# Patient Record
Sex: MALE | Race: BLACK OR AFRICAN AMERICAN | Employment: FULL TIME | URBAN - METROPOLITAN AREA
[De-identification: names, ages, dates, MRNs, and addresses within clinical notes are randomized per-mention and may not be internally consistent; named-entity substitution may affect disease eponyms.]

---

## 2022-03-26 ENCOUNTER — HOSPITAL ENCOUNTER (EMERGENCY)
Age: 37
Discharge: HOME | End: 2022-03-26
Attending: EMERGENCY MEDICINE

## 2022-03-26 VITALS
RESPIRATION RATE: 16 BRPM | HEART RATE: 86 BPM | SYSTOLIC BLOOD PRESSURE: 127 MMHG | WEIGHT: 155 LBS | TEMPERATURE: 99.6 F | DIASTOLIC BLOOD PRESSURE: 77 MMHG | HEIGHT: 69 IN | OXYGEN SATURATION: 96 % | BODY MASS INDEX: 22.96 KG/M2

## 2022-03-26 DIAGNOSIS — Z20.822 PERSON UNDER INVESTIGATION FOR COVID-19: ICD-10-CM

## 2022-03-26 DIAGNOSIS — J06.9 VIRAL UPPER RESPIRATORY TRACT INFECTION: Primary | ICD-10-CM

## 2022-03-26 LAB — SARS-COV-2, COV2: NORMAL

## 2022-03-26 PROCEDURE — U0003 INFECTIOUS AGENT DETECTION BY NUCLEIC ACID (DNA OR RNA); SEVERE ACUTE RESPIRATORY SYNDROME CORONAVIRUS 2 (SARS-COV-2) (CORONAVIRUS DISEASE [COVID-19]), AMPLIFIED PROBE TECHNIQUE, MAKING USE OF HIGH THROUGHPUT TECHNOLOGIES AS DESCRIBED BY CMS-2020-01-R: HCPCS

## 2022-03-26 PROCEDURE — 99283 EMERGENCY DEPT VISIT LOW MDM: CPT

## 2022-03-26 NOTE — LETTER
[facility] [place]  [facility]  [address]  [facility] NEWS [facility] [id number] [phone]    Work/School Note    Date: 3/26/2022     To Whom It May concern:    Ian Go was evaluated by the following provider(s):  Attending Provider: Jemal Camejo MD  Physician Assistant: Sean Tello, [address] [place] [address] virus is suspected. Test is PENDING and expected in 48-72hrs. Per the CDC guidelines we recommend home isolation until the following conditions are all met:    1. At least five days have passed since symptoms first appeared (3/30/22)  2. After home isolation for five days, wearing a mask around others for the next five days,  3. At least 24 have passed since last fever without the use of fever-reducing medications and  4.  Symptoms (eg cough, shortness of breath) have improved      Sincerely,          MAC Morton

## 2022-03-26 NOTE — ED PROVIDER NOTES
EMERGENCY DEPARTMENT HISTORY AND PHYSICAL EXAM    Date: 3/26/2022  Patient Name: Macy Bonilla    History of Presenting Illness     Chief Complaint   Patient presents with    Covid Testing         History Provided By: Patient    11:36 AM  Macy Bonilla is a 30 y.o. male  who presents to the emergency department C/O request for COVID-19 test.  Patient states he began feeling ill yesterday with nasal congestion, slight sore throat, cough, subjective fever body aches and alteration in his taste. He is not vaccinated against COVID-19. 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 Pt denies ear pain, chest pain, shortness of breath,, and any other sxs or complaints. PCP: None    Current Outpatient Medications   Medication Sig Dispense Refill    oxyCODONE-acetaminophen (PERCOCET) 5-325 mg per tablet Take 1 Tab by mouth every four (4) hours as needed for Pain. 20 Tab 0       Past History     Past Medical History:  No past medical history on file. Past Surgical History:  No past surgical history on file. Family History:  No family history on file. Social History:  Social History     Tobacco Use    Smoking status: Not on file    Smokeless tobacco: Not on file   Substance Use Topics    Alcohol use: Not on file    Drug use: Not on file       Allergies:  No Known Allergies      Review of Systems   Review of Systems   Constitutional: Positive for fever. HENT: Positive for congestion and sore throat. Respiratory: Positive for cough. Musculoskeletal: Positive for myalgias. All other systems reviewed and are negative. Physical Exam     Vitals:    03/26/22 1119   BP: 127/77   Pulse: 86   Resp: 16   Temp: 99.6 °F (37.6 °C)   SpO2: 96%   Weight: 70.3 kg (155 lb)   Height: 5' 9\" (1.753 m)     Physical Exam  Vital signs and nursing notes reviewed. CONSTITUTIONAL: Alert. Well-appearing; well-nourished; in no apparent distress. HEAD: Normocephalic; atraumatic. EYES: Conjunctiva clear.    ENT: TM's normal. External ear normal. Normal nose; no rhinorrhea. Normal pharynx. Tonsils not enlarged without exudate. Moist mucus membranes. NECK: Supple; FROM without difficulty, non-tender; no cervical lymphadenopathy. CV: Normal S1, S2; no murmurs, rubs, or gallops. No chest wall tenderness. RESPIRATORY: Normal chest excursion with respiration; breath sounds clear and equal bilaterally; no wheezes, rhonchi, or rales. NEURO: A & O x3. PSYCH:  Mood and affect appropriate. Diagnostic Study Results     Labs -     Recent Results (from the past 12 hour(s))   SARS-COV-2    Collection Time: 03/26/22 11:50 AM   Result Value Ref Range    SARS-CoV-2 by PCR Please find results under separate order         Radiologic Studies -   No orders to display     CT Results  (Last 48 hours)    None        CXR Results  (Last 48 hours)    None          Medications given in the ED-  Medications - No data to display      Medical Decision Making   I am the first provider for this patient. I reviewed the vital signs, available nursing notes, past medical history, past surgical history, family history and social history. Vital Signs-Reviewed the patient's vital signs. Records Reviewed: Nursing Notes      Procedures:  Procedures    ED Course:  11:36 AM   Initial assessment performed. The patients presenting problems have been discussed, and they are in agreement with the care plan formulated and outlined with them. I have encouraged them to ask questions as they arise throughout their visit. Provider Notes (Medical Decision Making): Joe Yanes is a 30 y.o. male presents with mild URI symptoms that began yesterday and concern for COVID-19. He is not vaccinated. His vitals are stable, exam normal.  Symptoms are consistent with a viral URI. COVID-19 test sent and pending at this time. Strict isolation per current CDC guidelines and return precautions discussed.   COVID-19 test sent and pending at this time. Diagnosis and Disposition       DISCHARGE NOTE:    Mariella Fuller  results have been reviewed with him. He has been counseled regarding his diagnosis, treatment, and plan. He verbally conveys understanding and agreement of the signs, symptoms, diagnosis, treatment and prognosis and additionally agrees to follow up as discussed. He also agrees with the care-plan and conveys that all of his questions have been answered. I have also provided discharge instructions for him that include: educational information regarding their diagnosis and treatment, and list of reasons why they would want to return to the ED prior to their follow-up appointment, should his condition change. He has been provided with education for proper emergency department utilization. CLINICAL IMPRESSION:    1. Viral upper respiratory tract infection    2. Person under investigation for COVID-19        PLAN:  1. D/C Home  2. Discharge Medication List as of 3/26/2022 12:11 PM        3. Follow-up Information     Follow up With Specialties Details Why Contact Info    Your PCP or Urgent Care   As needed     [facility] EMERGENCY DEPT Emergency Medicine  As needed, If symptoms worsen [address] [address]  [phone]        _______________________________      Please note that this dictation was completed with [employer], the computer voice recognition software. Quite often unanticipated grammatical, syntax, homophones, and other interpretive errors are inadvertently transcribed by the computer software. Please disregard these errors. Please excuse any errors that have escaped final proofreading.

## 2022-03-26 NOTE — ED TRIAGE NOTES
Pt arrives ambulatory to ED with c\o need for covid test, pt sts he has chills, diarrhea, loss of taste and smell, HA that started yesterday, pt is able to make needs known speaking in complete sentences, pt in nad at this time

## 2022-03-28 ENCOUNTER — PATIENT OUTREACH (OUTPATIENT)
Dept: CASE MANAGEMENT | Age: 37
End: 2022-03-28

## 2022-03-28 LAB — SARS-COV-2, NAA: DETECTED

## 2022-03-28 NOTE — PROGRESS NOTES
Ambulatory Care Coordination ED COVID Follow up Call    Challenges to be reviewed by the provider   Additional needs identified to be addressed with provider no  none           Encounter was not routed to provider for escalation. Method of communication with provider : none    Discussed COVID-19 related testing which was available at this time. Test results were positive. Patient informed of results, if available? yes. Current Symptoms: no new symptoms and no worsening symptoms    Reviewed New or Changed Meds: yes    Do you have what you need at home?  Durable Medical Equipment ordered at discharge: None   Home Health/Outpatient orders at discharge: none    Pulse oximeter? no Discussed and confirmed pulse oximeter discharge instructions and when to notify provider or seek emergency care. Patient education provided: Reviewed appropriate site of care based on symptoms and resources available to patient including: PCP. LPN Care Coordinator. Follow up appointment recommended: no. If no appointment scheduled, scheduling offered: no.  No future appointments. Interventions: Obtained and reviewed discharge summary and/or continuity of care documents  Reviewed discharge instructions, medical action plan and red flags with patient who verbalized understanding. Provided contact information for future needs. Plan for follow-up call in 5-7 days based on severity of symptoms and risk factors. Plan for next call: COVID follow up.      Jessenia Wilson LPN

## 2022-03-28 NOTE — PROGRESS NOTES
Date/Time:  3/28/2022 9:25 AM   Call within 2 business days of discharge: Yes   Attempted to reach patient by telephone. Left HIPPA compliant message requesting a return call. Will attempt to reach patient again. COVID 19 test pending.

## 2022-03-28 NOTE — CALL BACK NOTE
3:35 PM  2022    + SARS-COV-2. Called patient. Confirmed . Discussed + result. Pt notes no sxs on day 3. Discussed CDC isolation. Reasons to RTED discussed with pt. All questions answered. Pt expressed understanding.      Dorothy Ryder, OJNATHAN

## 2022-04-08 ENCOUNTER — PATIENT OUTREACH (OUTPATIENT)
Dept: CASE MANAGEMENT | Age: 37
End: 2022-04-08

## 2022-04-08 NOTE — PROGRESS NOTES
Date/Time:  4/8/2022 10:30 AM   Call within 2 business days of discharge: Yes   Attempted to reach patient by telephone. Unable to leave HIPPA compliant message requesting a return call. His episode is resolved.